# Patient Record
Sex: FEMALE | Race: OTHER | URBAN - METROPOLITAN AREA
[De-identification: names, ages, dates, MRNs, and addresses within clinical notes are randomized per-mention and may not be internally consistent; named-entity substitution may affect disease eponyms.]

---
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
## 2021-04-20 ENCOUNTER — RESULT REVIEW (OUTPATIENT)
Age: 21
End: 2021-04-20

## 2022-05-02 ENCOUNTER — APPOINTMENT (OUTPATIENT)
Dept: OTOLARYNGOLOGY | Facility: CLINIC | Age: 22
End: 2022-05-02

## 2023-08-23 ENCOUNTER — OFFICE (OUTPATIENT)
Dept: URBAN - METROPOLITAN AREA CLINIC 94 | Facility: CLINIC | Age: 23
Setting detail: OPHTHALMOLOGY
End: 2023-08-23
Payer: COMMERCIAL

## 2023-08-23 DIAGNOSIS — H40.013: ICD-10-CM

## 2023-08-23 DIAGNOSIS — H16.223: ICD-10-CM

## 2023-08-23 PROCEDURE — 92083 EXTENDED VISUAL FIELD XM: CPT | Performed by: OPHTHALMOLOGY

## 2023-08-23 PROCEDURE — 92020 GONIOSCOPY: CPT | Performed by: OPHTHALMOLOGY

## 2023-08-23 PROCEDURE — 92014 COMPRE OPH EXAM EST PT 1/>: CPT | Performed by: OPHTHALMOLOGY

## 2023-08-23 PROCEDURE — 92133 CPTRZD OPH DX IMG PST SGM ON: CPT | Performed by: OPHTHALMOLOGY

## 2023-08-23 ASSESSMENT — AXIALLENGTH_DERIVED
OS_AL: 25.3212
OD_AL: 25.5444
OS_AL: 25.6049
OD_AL: 25.5444
OD_AL: 25.7748
OS_AL: 25.3212

## 2023-08-23 ASSESSMENT — VISUAL ACUITY
OD_BCVA: 20/25+
OS_BCVA: 20/25

## 2023-08-23 ASSESSMENT — REFRACTION_MANIFEST
OD_AXIS: 090
OS_CYLINDER: +0.50
OD_SPHERE: -2.75
OD_VA1: 20/20-2
OS_VA1: 20/20-2
OS_SPHERE: -2.50
OS_AXIS: 080
OS_CYLINDER: +0.50
OD_CYLINDER: +0.25
OS_SPHERE: -2.50
OS_AXIS: 080
OS_VA1: 20/20-2
OD_AXIS: 090
OD_SPHERE: -2.75
OD_CYLINDER: +0.25
OD_VA1: 20/20-2

## 2023-08-23 ASSESSMENT — REFRACTION_CURRENTRX
OD_CYLINDER: -0.25
OD_OVR_VA: 20/
OS_SPHERE: -2.50
OD_SPHERE: -2.25
OD_VPRISM_DIRECTION: SV
OD_AXIS: 177
OS_CYLINDER: -0.75
OS_OVR_VA: 20/
OS_VPRISM_DIRECTION: SV
OS_AXIS: 179

## 2023-08-23 ASSESSMENT — REFRACTION_AUTOREFRACTION
OD_AXIS: 173
OS_SPHERE: -2.50
OS_CYLINDER: -0.75
OS_AXIS: 179
OD_SPHERE: -2.75
OD_CYLINDER: -0.75

## 2023-08-23 ASSESSMENT — SPHEQUIV_DERIVED
OS_SPHEQUIV: -2.25
OS_SPHEQUIV: -2.25
OD_SPHEQUIV: -2.625
OD_SPHEQUIV: -3.125
OS_SPHEQUIV: -2.875
OD_SPHEQUIV: -2.625

## 2023-08-23 ASSESSMENT — KERATOMETRY
OS_K1POWER_DIOPTERS: 40.75
OS_AXISANGLE_DEGREES: 093
OD_AXISANGLE_DEGREES: 087
OD_K1POWER_DIOPTERS: 41.00
OD_K2POWER_DIOPTERS: 41.75
OS_K2POWER_DIOPTERS: 42.25

## 2023-08-23 ASSESSMENT — TONOMETRY
OS_IOP_MMHG: 16
OD_IOP_MMHG: 16

## 2023-08-23 ASSESSMENT — SUPERFICIAL PUNCTATE KERATITIS (SPK)
OS_SPK: T
OD_SPK: T

## 2023-08-23 ASSESSMENT — CONFRONTATIONAL VISUAL FIELD TEST (CVF)
OD_FINDINGS: FULL
OS_FINDINGS: FULL

## 2023-08-24 ENCOUNTER — OFFICE (OUTPATIENT)
Dept: URBAN - METROPOLITAN AREA CLINIC 116 | Facility: CLINIC | Age: 23
Setting detail: OPHTHALMOLOGY
End: 2023-08-24
Payer: COMMERCIAL

## 2023-08-24 DIAGNOSIS — H52.13: ICD-10-CM

## 2023-08-24 DIAGNOSIS — Z01.00: ICD-10-CM

## 2023-08-24 PROBLEM — H40.013 GLAUCOMA SUSPECT, LOW RISK 1-2 FACTORS; BOTH EYES: Status: ACTIVE | Noted: 2023-08-23

## 2023-08-24 PROCEDURE — CLRNW CONTACT LENS RENEWAL- NO LENS CHANGE: Performed by: OPTOMETRIST

## 2023-08-24 PROCEDURE — 92014 COMPRE OPH EXAM EST PT 1/>: CPT | Performed by: OPTOMETRIST

## 2023-08-24 ASSESSMENT — AXIALLENGTH_DERIVED
OD_AL: 25.4372
OS_AL: 25.6049
OD_AL: 25.4372
OD_AL: 25.6657
OS_AL: 25.8364
OS_AL: 25.3212
OD_AL: 25.9571
OS_AL: 25.3212

## 2023-08-24 ASSESSMENT — REFRACTION_MANIFEST
OD_CYLINDER: +0.25
OS_CYLINDER: +0.50
OD_AXIS: 170
OD_CYLINDER: -0.25
OS_VA1: 20/20-2
OD_SPHERE: -2.75
OS_VA1: 20/20
OD_VA1: 20/20-2
OS_AXIS: 080
OS_CYLINDER: +0.50
OS_SPHERE: -2.50
OD_SPHERE: -2.75
OD_VA1: 20/20-2
OD_SPHERE: -3.00
OD_CYLINDER: +0.25
OS_CYLINDER: -0.25
OD_VA1: 20/20
OS_VA1: 20/20-2
OD_AXIS: 090
OS_AXIS: 080
OS_SPHERE: -2.75
OD_AXIS: 090
OS_AXIS: 180
OS_SPHERE: -2.50

## 2023-08-24 ASSESSMENT — REFRACTION_CURRENTRX
OD_SPHERE: -2.25
OD_CYLINDER: -0.25
OS_CYLINDER: -0.75
OD_AXIS: 010
OD_OVR_VA: 20/
OS_OVR_VA: 20/
OS_CYLINDER: -0.25
OD_CYLINDER: -0.25
OS_SPHERE: -2.25
OD_VPRISM_DIRECTION: SV
OS_AXIS: 165
OD_AXIS: 177
OS_OVR_VA: 20/
OS_SPHERE: -2.50
OD_SPHERE: -2.25
OS_AXIS: 179
OD_OVR_VA: 20/
OS_VPRISM_DIRECTION: SV

## 2023-08-24 ASSESSMENT — CONFRONTATIONAL VISUAL FIELD TEST (CVF)
OD_FINDINGS: FULL
OS_FINDINGS: FULL

## 2023-08-24 ASSESSMENT — VISUAL ACUITY
OD_BCVA: 20/25+
OS_BCVA: 20/25

## 2023-08-24 ASSESSMENT — KERATOMETRY
OS_K2POWER_DIOPTERS: 42.25
OS_K1POWER_DIOPTERS: 40.75
OD_AXISANGLE_DEGREES: 090
OD_K1POWER_DIOPTERS: 41.00
OD_K2POWER_DIOPTERS: 42.25
OS_AXISANGLE_DEGREES: 090

## 2023-08-24 ASSESSMENT — SUPERFICIAL PUNCTATE KERATITIS (SPK)
OS_SPK: T
OD_SPK: T

## 2023-08-24 ASSESSMENT — REFRACTION_AUTOREFRACTION
OD_AXIS: 175
OD_CYLINDER: -0.50
OD_SPHERE: -3.50
OS_SPHERE: -3.00
OS_AXIS: 180
OS_CYLINDER: -0.75

## 2023-08-24 ASSESSMENT — SPHEQUIV_DERIVED
OD_SPHEQUIV: -2.625
OS_SPHEQUIV: -2.25
OS_SPHEQUIV: -3.375
OS_SPHEQUIV: -2.25
OS_SPHEQUIV: -2.875
OD_SPHEQUIV: -3.125
OD_SPHEQUIV: -3.75
OD_SPHEQUIV: -2.625

## 2023-08-24 ASSESSMENT — TONOMETRY
OS_IOP_MMHG: 16
OD_IOP_MMHG: 16

## 2023-10-13 ENCOUNTER — NON-APPOINTMENT (OUTPATIENT)
Age: 23
End: 2023-10-13

## 2025-06-21 ENCOUNTER — ASOB RESULT (OUTPATIENT)
Age: 25
End: 2025-06-21

## 2025-06-21 ENCOUNTER — APPOINTMENT (OUTPATIENT)
Dept: ANTEPARTUM | Facility: CLINIC | Age: 25
End: 2025-06-21
Payer: COMMERCIAL

## 2025-06-21 PROCEDURE — 76813 OB US NUCHAL MEAS 1 GEST: CPT

## 2025-06-21 PROCEDURE — 76801 OB US < 14 WKS SINGLE FETUS: CPT | Mod: 59

## 2025-08-16 ENCOUNTER — APPOINTMENT (OUTPATIENT)
Dept: ANTEPARTUM | Facility: CLINIC | Age: 25
End: 2025-08-16

## 2025-08-16 ENCOUNTER — ASOB RESULT (OUTPATIENT)
Age: 25
End: 2025-08-16

## 2025-08-16 PROCEDURE — 76805 OB US >/= 14 WKS SNGL FETUS: CPT

## 2025-09-25 PROBLEM — O99.019 ANEMIA DURING PREGNANCY: Status: ACTIVE | Noted: 2025-09-25
